# Patient Record
Sex: FEMALE | Race: WHITE | Employment: FULL TIME | URBAN - METROPOLITAN AREA
[De-identification: names, ages, dates, MRNs, and addresses within clinical notes are randomized per-mention and may not be internally consistent; named-entity substitution may affect disease eponyms.]

---

## 2022-03-08 PROBLEM — J30.89 ENVIRONMENTAL AND SEASONAL ALLERGIES: Status: ACTIVE | Noted: 2022-03-08

## 2022-03-08 PROBLEM — K44.9 HIATAL HERNIA WITHOUT GANGRENE AND OBSTRUCTION: Status: ACTIVE | Noted: 2017-08-02

## 2022-03-08 PROBLEM — E78.2 MIXED HYPERLIPIDEMIA: Status: ACTIVE | Noted: 2022-03-08

## 2022-03-08 PROBLEM — Z82.49 FAMILY HISTORY OF HEART DISEASE: Chronic | Status: ACTIVE | Noted: 2022-03-08

## 2022-03-08 PROBLEM — I10 PRIMARY HYPERTENSION: Chronic | Status: ACTIVE | Noted: 2022-03-08

## 2022-03-08 PROBLEM — F41.9 ANXIETY: Status: ACTIVE | Noted: 2022-03-08

## 2022-03-08 PROBLEM — K21.9 GASTROESOPHAGEAL REFLUX DISEASE: Status: ACTIVE | Noted: 2022-03-08

## 2022-03-18 PROBLEM — K21.9 GASTROESOPHAGEAL REFLUX DISEASE: Status: ACTIVE | Noted: 2022-03-08

## 2022-03-18 PROBLEM — I10 PRIMARY HYPERTENSION: Status: ACTIVE | Noted: 2022-03-08

## 2022-03-19 PROBLEM — E78.2 MIXED HYPERLIPIDEMIA: Status: ACTIVE | Noted: 2022-03-08

## 2022-03-19 PROBLEM — J30.89 ENVIRONMENTAL AND SEASONAL ALLERGIES: Status: ACTIVE | Noted: 2022-03-08

## 2022-03-19 PROBLEM — Z82.49 FAMILY HISTORY OF HEART DISEASE: Status: ACTIVE | Noted: 2022-03-08

## 2022-03-19 PROBLEM — K44.9 HIATAL HERNIA WITHOUT GANGRENE AND OBSTRUCTION: Status: ACTIVE | Noted: 2017-08-02

## 2022-03-19 PROBLEM — F41.9 ANXIETY: Status: ACTIVE | Noted: 2022-03-08

## 2022-03-31 ENCOUNTER — HOSPITAL ENCOUNTER (OUTPATIENT)
Dept: CT IMAGING | Age: 47
Discharge: HOME | End: 2022-03-31
Attending: NURSE PRACTITIONER
Payer: SELF-PAY

## 2022-03-31 DIAGNOSIS — Z82.49 FAMILY HISTORY OF HEART DISEASE: Chronic | ICD-10-CM

## 2022-03-31 DIAGNOSIS — I10 PRIMARY HYPERTENSION: Chronic | ICD-10-CM

## 2022-03-31 PROCEDURE — 75571 CT HRT W/O DYE W/CA TEST: CPT

## 2022-07-06 ENCOUNTER — OFFICE VISIT (OUTPATIENT)
Dept: INTERNAL MEDICINE CLINIC | Facility: CLINIC | Age: 47
End: 2022-07-06
Payer: COMMERCIAL

## 2022-07-06 VITALS
HEIGHT: 66 IN | OXYGEN SATURATION: 99 % | HEART RATE: 96 BPM | BODY MASS INDEX: 30.86 KG/M2 | SYSTOLIC BLOOD PRESSURE: 162 MMHG | WEIGHT: 192 LBS | TEMPERATURE: 98.5 F | DIASTOLIC BLOOD PRESSURE: 93 MMHG

## 2022-07-06 DIAGNOSIS — Z12.11 ENCOUNTER FOR COLORECTAL CANCER SCREENING: ICD-10-CM

## 2022-07-06 DIAGNOSIS — J30.89 ENVIRONMENTAL AND SEASONAL ALLERGIES: ICD-10-CM

## 2022-07-06 DIAGNOSIS — Z12.12 ENCOUNTER FOR COLORECTAL CANCER SCREENING: ICD-10-CM

## 2022-07-06 DIAGNOSIS — E78.2 MIXED HYPERLIPIDEMIA: ICD-10-CM

## 2022-07-06 DIAGNOSIS — Z12.31 ENCOUNTER FOR SCREENING MAMMOGRAM FOR BREAST CANCER: ICD-10-CM

## 2022-07-06 DIAGNOSIS — I10 PRIMARY HYPERTENSION: Primary | ICD-10-CM

## 2022-07-06 DIAGNOSIS — Z23 NEED FOR DIPHTHERIA-TETANUS-PERTUSSIS (TDAP) VACCINE: ICD-10-CM

## 2022-07-06 DIAGNOSIS — F41.9 ANXIETY: ICD-10-CM

## 2022-07-06 PROCEDURE — 90471 IMMUNIZATION ADMIN: CPT | Performed by: NURSE PRACTITIONER

## 2022-07-06 PROCEDURE — 90715 TDAP VACCINE 7 YRS/> IM: CPT | Performed by: NURSE PRACTITIONER

## 2022-07-06 PROCEDURE — 99214 OFFICE O/P EST MOD 30 MIN: CPT | Performed by: NURSE PRACTITIONER

## 2022-07-06 RX ORDER — LISINOPRIL 10 MG/1
10 TABLET ORAL DAILY
Qty: 30 TABLET | Refills: 5 | Status: SHIPPED | OUTPATIENT
Start: 2022-07-06

## 2022-07-06 SDOH — ECONOMIC STABILITY: FOOD INSECURITY: WITHIN THE PAST 12 MONTHS, YOU WORRIED THAT YOUR FOOD WOULD RUN OUT BEFORE YOU GOT MONEY TO BUY MORE.: NEVER TRUE

## 2022-07-06 SDOH — ECONOMIC STABILITY: FOOD INSECURITY: WITHIN THE PAST 12 MONTHS, THE FOOD YOU BOUGHT JUST DIDN'T LAST AND YOU DIDN'T HAVE MONEY TO GET MORE.: NEVER TRUE

## 2022-07-06 ASSESSMENT — PATIENT HEALTH QUESTIONNAIRE - PHQ9
2. FEELING DOWN, DEPRESSED OR HOPELESS: 0
SUM OF ALL RESPONSES TO PHQ QUESTIONS 1-9: 0
1. LITTLE INTEREST OR PLEASURE IN DOING THINGS: 0
SUM OF ALL RESPONSES TO PHQ QUESTIONS 1-9: 0
SUM OF ALL RESPONSES TO PHQ9 QUESTIONS 1 & 2: 0

## 2022-07-06 ASSESSMENT — ENCOUNTER SYMPTOMS
SHORTNESS OF BREATH: 0
WHEEZING: 0
COUGH: 0

## 2022-07-06 ASSESSMENT — SOCIAL DETERMINANTS OF HEALTH (SDOH): HOW HARD IS IT FOR YOU TO PAY FOR THE VERY BASICS LIKE FOOD, HOUSING, MEDICAL CARE, AND HEATING?: NOT HARD AT ALL

## 2022-07-06 NOTE — PROGRESS NOTES
[facility]  Office Visit Note    Subjective:  Chief Complaint   Patient presents with    Annual Exam     Physical/ has a cough possible due to BP medcation had a really bad cold late may with cough tested negative for COVID thinks the cough is still there from being sick        Patient ID: Cortney Gates is a 41 y.o. female presenting to the office for the above. 62-year-old female for follow up. 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      BMP, TSH, CBC all wnl at work screening February 2022. Had a cold a few months ago; tested negative for Covid. Continues to have mild nonproductive cough. Has stopped her allergy medication. Does believe it is improving. --Try adding back allergy medication. If not improving, try changing lisinopril to losartan.      Hypertension--  HPI March 2022:   Never diagnosed with HTN before. Has been running 150s/90s at home. It was 160/82 at recent work screening. Elevated in office today as well. She checks her blood pressure at home, with readings 150s/90s. She tries to follow a low sodium diet. She walks daily and does workout videos for exercise. Denies chest pain, palpitations, shortness of breath, peripheral edema, severe headaches, dizziness, vision changes. 4/5/22:   She started lisinopril 10mg BID. Home BP much better controlled. She has been checking every day, with readings almost all 110-135/60-80. Denies chest pain, palpitations, shortness of breath, peripheral edema, severe headaches, dizziness, vision changes. ECG in office with sinus tachycardia; she was extremely anxious during the ECG.   7/6/22: She is taking lisinopril 10mg once a day; tolerating well. Home readings 110-120s/70s. --Advise low sodium DASH diet, regular exercise, weight loss.     Monitor BP regularly at home, and notify office if consistently >130/90.      Hyperlipidemia--  Last lipid panel February 2022, with cholesterol 241, HDL 48, , trigs 134. Not on medication. Current 10-year Alto risk score 7% at recent work screening.   -- Encouraged to follow a healthy low-fat diet, avoiding saturated/trans fats/fried and fatty foods, get regular exercise, and weight loss.      GERD--  Taking esomeprazole 20mg daily; tolerating well. No red flag symptoms.      Environmental allergies--  Stable with Allegra. Seasonal.       Anxiety--  HPI March 2022:   Has struggled with anxiety for years. Work stress has increased due to [employer]. She cares for elderly parents and has tried over past few years to prevent bringing anything to them. Has felt increased isolation. She has tried yoga, but feels her anxiety is uncontrolled. Discussed options, and she would like to try medication. Has never taken any antidepressants or antianxiety medications in the past.   She denies issues with depression or thoughts of hurting herself. 4/5/22:   She has had several panic attacks since our last visit. Has been working on coping mechanisms (deep breathing, etc.). Tachycardia, chest discomfort, sweating hands and feet, dry mouth. Feels her anxiety is partially work-related; symptoms (and BP as well) is better on the weekends. Struggling with daily anxiety at moderate level, and occasional panic attacks. She has not started the Lexapro. Not really interested in a daily medication at this time, as school is getting out soon, and she believes her symptoms will improve. 7/6/22: She has psychiatry appointment in September. She used the hydroxyzine twice, without much relief. Overall, she feels that her symptoms have really improved since school got out. She is not interested in medication at this time.      Health Maintenance:  *Colorectal cancer screening: No family history of colorectal cancer (paternal grandmother had polyps). Discussed options; Cologuard ordered today. *CAC score: score of 0 in March 2022.  She has strong family history of heart disease. *Mammogram: ordered today   *Pap: she plans to establish with gynecology   *TDAP: 7/6/22   *Flu:   *YRINS02: completed March 2021, booster January 2022       History: Allergies   Allergen Reactions    Cats Claw (Uncaria Tomentosa) Rash    Dog Epithelium Allergy Skin Test Rash       Past Medical History:   Diagnosis Date    Anxiety     GERD (gastroesophageal reflux disease)     Hiatal hernia        History reviewed. No pertinent surgical history.     Family History   Problem Relation Age of Onset    Hypertension Paternal Grandfather     Colon Cancer Paternal Grandmother     Osteoarthritis Mother     Diabetes Mother     Elevated Lipids Mother    Esther Craw Bladder Disease Mother     Heart Disease Mother     Hypertension Mother     Heart Attack Mother         in her 46s    Breast Cancer Mother     Elevated Lipids Father     Heart Disease Father     Hypertension Father     Heart Attack Father     Diabetes Maternal Grandmother     Heart Disease Maternal Grandmother     Hypertension Maternal Grandmother     Heart Attack Maternal Grandmother     Diabetes Maternal Grandfather     Heart Disease Maternal Grandfather     Hypertension Maternal Grandfather     Heart Attack Maternal Grandfather     Heart Disease Paternal Grandmother     Hypertension Paternal Grandmother     Stroke Paternal Grandmother     Heart Disease Paternal Grandfather        Social History     Tobacco Use   Smoking Status Never Smoker   Smokeless Tobacco Never Used       Social History     Substance and Sexual Activity   Alcohol Use Never       Current Outpatient Medications   Medication Sig Dispense Refill    Omega-3 Fatty Acids (FISH OIL ADULT GUMMIES PO) Take by mouth      lisinopril (PRINIVIL;ZESTRIL) 10 MG tablet Take 1 tablet by mouth daily 30 tablet 5    Cholecalciferol 50 MCG (2000 UT) TABS Take 1 tablet by mouth      fexofenadine (ALLEGRA) 180 MG tablet Take 180 mg by mouth  hydrOXYzine (ATARAX) 25 MG tablet Take 25 mg by mouth 2 times daily as needed       No current facility-administered medications for this visit. Review of Systems:  Review of Systems   Constitutional: Negative for activity change, appetite change, chills, fever and unexpected weight change. Respiratory: Negative for cough, shortness of breath and wheezing. Cardiovascular: Negative for chest pain and palpitations. Skin: Negative for pallor and rash. Neurological: Negative for dizziness, seizures, syncope, weakness and headaches. Psychiatric/Behavioral: Negative for dysphoric mood and suicidal ideas. The patient is nervous/anxious. See HPI for other pertinent positives and negatives. Objective:  Vitals:    07/06/22 0953   BP: (!) 162/93   Site: Left Upper Arm   Position: Sitting   Cuff Size: Large Adult   Pulse: 96   Temp: 98.5 °F (36.9 °C)   TempSrc: Temporal   SpO2: 99%   Weight: 192 lb (87.1 kg)   Height: 5' 6\" (1.676 m)       Body mass index is 30.99 kg/m². Physical Exam  Vitals and nursing note reviewed. Constitutional:       General: She is not in acute distress. Appearance: Normal appearance. She is not ill-appearing or diaphoretic. HENT:      Head: Normocephalic and atraumatic. Eyes:      General: No scleral icterus. Right eye: No discharge. Left eye: No discharge. Cardiovascular:      Rate and Rhythm: Normal rate and regular rhythm. Heart sounds: Normal heart sounds. Pulmonary:      Effort: Pulmonary effort is normal. No respiratory distress. Breath sounds: Normal breath sounds. Skin:     General: Skin is warm and dry. Neurological:      Mental Status: She is alert and oriented to person, place, and time.    Psychiatric:         Mood and Affect: Mood normal.         Speech: Speech normal.         Behavior: Behavior normal.                    PHQ-9  7/6/2022   Little interest or pleasure in doing things 0   Little interest or pleasure in doing things -   Feeling down, depressed, or hopeless 0   Trouble falling or staying asleep, or sleeping too much -   Feeling tired or having little energy -   Poor appetite, weight loss, or overeating -   Feeling bad about yourself - or that you are a failure or have let yourself or your family down -   Trouble concentrating on things such as school, work, reading, or watching TV -   Moving or speaking so slowly that other people could have noticed; or the opposite being so fidgety that others notice -   Thoughts of being better off dead, or hurting yourself in some way -   PHQ-2 Score 0   Total Score PHQ 2 -   PHQ-9 Total Score 0   PHQ 9 Score -        Assessment/Plan:      Health Maintenance Due   Topic Date Due    HIV screen  Never done    Hepatitis C screen  Never done    Cervical cancer screen  Never done    Diabetes screen  Never done    Lipids  Never done    Breast cancer screen  Never done    Colorectal Cancer Screen  Never done          Neto Connors was seen today for annual exam.    Diagnoses and all orders for this visit:    Primary hypertension  -     lisinopril (PRINIVIL;ZESTRIL) 10 MG tablet; Take 1 tablet by mouth daily    Mixed hyperlipidemia    Anxiety    Environmental and seasonal allergies    Encounter for screening mammogram for breast cancer  -     JANNA DIGITAL SCREEN W OR WO CAD BILATERAL; Future    Encounter for colorectal cancer screening  -     Cologuard (Fecal DNA Colorectal Cancer Screening); Future    Need for diphtheria-tetanus-pertussis (Tdap) vaccine  -     Tdap (age 6y and older) IM (Boostrix)        Patient states she is otherwise doing well; has no further questions or concerns at this visit. Encouraged to contact office with any concerns prior to next visit. Return in about 6 months (around 1/6/2023), or if symptoms worsen or fail to improve, for Follow up.     Karin Ge, APRN - CNP

## 2022-07-13 ENCOUNTER — HOSPITAL ENCOUNTER (OUTPATIENT)
Dept: MAMMOGRAPHY | Age: 47
Discharge: HOME | End: 2022-07-16
Payer: COMMERCIAL

## 2022-07-13 DIAGNOSIS — Z12.31 ENCOUNTER FOR SCREENING MAMMOGRAM FOR BREAST CANCER: ICD-10-CM

## 2022-07-13 PROCEDURE — 77067 SCR MAMMO BI INCL CAD: CPT

## 2022-07-21 ENCOUNTER — HOSPITAL ENCOUNTER (OUTPATIENT)
Dept: MAMMOGRAPHY | Age: 47
Discharge: HOME | End: 2022-07-24
Payer: COMMERCIAL

## 2022-07-21 ENCOUNTER — APPOINTMENT (OUTPATIENT)
Dept: MAMMOGRAPHY | Age: 47
End: 2022-07-21
Payer: COMMERCIAL

## 2022-07-21 DIAGNOSIS — R92.8 ABNORMAL SCREENING MAMMOGRAM: ICD-10-CM

## 2022-07-21 PROCEDURE — 77065 DX MAMMO INCL CAD UNI: CPT

## 2022-09-12 ENCOUNTER — OFFICE VISIT (OUTPATIENT)
Dept: BEHAVIORAL/MENTAL HEALTH CLINIC | Facility: CLINIC | Age: 47
End: 2022-09-12
Payer: COMMERCIAL

## 2022-09-12 DIAGNOSIS — F41.9 ANXIETY DISORDER, UNSPECIFIED TYPE: Primary | ICD-10-CM

## 2022-09-12 PROCEDURE — 90791 PSYCH DIAGNOSTIC EVALUATION: CPT | Performed by: SOCIAL WORKER

## 2022-09-13 NOTE — PROGRESS NOTES
[]fatigue, []lack of interest,   [x]decreased concentration, [x]anxiety [x]panic attacks, []suicidal thoughts,    []homicidal thoughts, []hallucinations, []appetite/sleep pxs   []delusions, []racing thoughts, []grandiosity, []dheeraj,   []eating disordered symptoms, []obsessions and compulsions, []hopelessness,   []feelings of failure, []excessive worrying []other    Mental Health History (including family history):  []Current Therapy    []Inpatient Treatment  []Past Therapy    [x]PCP  []Current Psychiatrist   []Family History- paternal  []Past Psychiatrist    []Family History- maternal   []PHP    Summary:  The pt recently sought help from her PCP last March. She was referred to this SW and she was rescribed medication. She has not filled her Lexapro script and she did report benefit from the hydroxyzine. Orientation: Pt was oriented to person, place, time, and purpose of interview. Appearance/Personal Hygiene: Pt was appropriately dressed and neatly groomed. Eye Contact: Good    Psychosis:  Hallucinations: None  Paranoia/Delusions: None                                          Insight/Judgment: Insight and judgment were intact. Intelligence: Intelligence level appears to be WNL. Memory/Cognition/Executive Functioning:  Pt denies memory deficits. Executive functioning skills appear to be intact. Attention Span/Concentration:  The pt reports a decrease in concentration due to her increase in anxiety. Fund Of Knowledge: This is within normal limits. Developmental/Language Issues: English is the pt.'s primary language. No developmental issues reported.     Mood/Affect:   []Angry  [x]Anxious  []Appropriate  []Bright   []Distressed  []Fatigued  []Flat   []Sad, Depressed  []Guarded  []Irritable  []Labile  []Even       Thought Process:   []Blocking  []Circumstantial  []Clang   [x]Coherent  []Egocentric   []Evasive  []Flight of ideas  []Incoherent  []Loose Assn   []Magical think []Neologisms  []Perseveration  [x]Rational  []Tangential  []Word Salad         Suicidal Ideation/Intentions (present status, history, plan, intent, past attempts): The pt denies current or past suicidal ideation. Homicidal Ideation/Intentions: The pt denies current or past homicidal ideation. Substance Abuse (present use, past use, substances used, family history): The pt denies current or past substance abuse issues. She states she has an uncle of her dad's side of the family that is an alcoholic. Current Living Situation (type of dwelling, length of residence, safety concerns, stability):  []Rent  [x]Own  [x]House []Mobile Home []Apt  []Condo/Town Home  Time at Current Residence:3.5 years  Utilities Working:yes  Safe/Secure Environment:yes  Who lives in the home? The pt lives alone. Relationship Status (past relationships, current status, children, relationship issues): The pt is single and she has never been . She reports one past serious relationship that lasted about 10 years. She met him as an undergrad. They both pursued degrees after they received their Bachelor's and ultimately their careers took them in different directions. The pt identifies as straight and she denies any abusive relationships in the past.    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 This is her fifth year at [employer]. She works at [employer] in [employer] starting this year. This is better than her previous assignment but she still reports much stress. Education:  The pt has a Masters in Special Education and Speech Pathology.  History: []Yes  [x]No    Legal Issues: []Yes  [x]No    Support Systems: The pt receives much support from her sister, her parents and other family members. She used to have many close friends but since she moved to [facility], her social contact has decreased.     Self Esteem/ Body Image:  The pt feels her self esteem is okay. Family of Origin Dynamics:    Birthplace: The pt was born and raised in [place]. Your primary caregivers:  She was raised by her biological parents and they are both still living and still . Family composition/siblings:  The pt has one older sister. General home atmosphere:  The pt reports a safe and happy childhood. Words that best describe your childhood: Stable, good    Your home environment and routines (like mealtimes and household rules): The pt feels she was raised in a fair, nurturing home. Summary:  The pt denies abuse or trauma in her childhood. Past Abuse/Trauma/Grief:  []Childhood Abuse    []Active PTSD Symptoms  []Domestic Violence- childhood  []Past PTSD Symptoms- not current  []Domestic Violence- adult   []Past Treatment for Trauma/Abuse  []Childhood Trauma    []Significant Losses  []Adulthood Trauma     Summary:  The pt denies abuse or trauma as an adult. She has lost grandparents but she has dealt with these losses appropriately. Coping Skills: The pt wants to learn coping skills. Attitude Towards Treatment: Positive    CBT, CPT, ERP, supportive therapy, solution focused therapy, DBT, ACT, motivational interviewing, psychodynamic therapy, and client centered/humanistic therapy may be utilized to address the following goals:     Pt will decrease her symptoms of depression and anxiety by recognizing cognitive distortions and positively reframing them as evidenced by self-report and lower PHQ and MORA scores. The pt will decrease the number of panic attacks each week by being able to identify triggering factors and practicing grounding techniques before the attach occurs for three months as evidenced by the pt reporting having no more than one panic attack a day.     Estimated Length of Treatment: 3-5 months    Follow-Up: 2 weeks

## 2022-09-29 ENCOUNTER — OFFICE VISIT (OUTPATIENT)
Dept: BEHAVIORAL/MENTAL HEALTH CLINIC | Facility: CLINIC | Age: 47
End: 2022-09-29
Payer: COMMERCIAL

## 2022-09-29 DIAGNOSIS — F41.9 ANXIETY DISORDER, UNSPECIFIED TYPE: Primary | ICD-10-CM

## 2022-09-29 PROCEDURE — 90837 PSYTX W PT 60 MINUTES: CPT | Performed by: SOCIAL WORKER

## 2022-09-30 NOTE — PROGRESS NOTES
INDIVIDUAL THERAPY NOTE  NAME: Stan Durbin    DATE: 9/29/22    TYPE OF SERVICE: Individual Therapy/In person visit/SW and pt in [place]    LOCATION OF SERVICE: [facility]    DURATION: 60 mins    DIAGNOSIS: :    1. Anxiety disorder, unspecified type        CHIEF COMPLAINT: anxiety    MENTAL STATUS EXAM:  Pt was appropriately groomed. Pt was 0x4. No unusual mannerisms were noted. No psychotic symptoms displayed by pt. Pt was cooperative and engaged in the session. Insight and judgment were intact. Denied suicidal or homicidal ideation. Fund of knowledge appears to be WNL. Fund of knowledge and attention span are WNL. Denies developmental or language difficulties. 0x4. Mood/Affect: mildly anxious with congruent affect  Thought Process: linear and coherent    Pt is progressing on goals. PLAN: CBT, DBT, Humanistic/Client Centered, ACT, Seeking Safety, Psychodynamic, ERP may be used to address goals. Summary of Service: This SW met with the pt face to face in the office. The pt continues to report anxiety. She has been trying to utilize the techniques discussed last session, Butterfly and Pretzel. She finds she can do these at home but work presents as a challenge. This SW and the pt discussed what thoughts she had about what would happen of she took more time to relax at work and if she set firmer boundaries at work. This SW encouraged the pt to challenge some of these thoughts. The Cognitive Distortions were introduced and she was given the list Of Cognitive Distortions and Socratic questions to take home. Concepts from Re Wiring the Anxious brain and how to carve new neural pathways that promoted relaxation and decreased anxiety were discussed. This will increase in future sessions. Follow Up: 2 weeks          Will continue to meet with and be available to patient as scheduled and per patient's request/compliance.

## 2023-02-03 DIAGNOSIS — Z12.12 ENCOUNTER FOR COLORECTAL CANCER SCREENING: ICD-10-CM

## 2023-02-03 DIAGNOSIS — Z12.11 ENCOUNTER FOR COLORECTAL CANCER SCREENING: ICD-10-CM

## 2023-02-23 ENCOUNTER — OFFICE VISIT (OUTPATIENT)
Dept: INTERNAL MEDICINE CLINIC | Facility: CLINIC | Age: 48
End: 2023-02-23
Payer: COMMERCIAL

## 2023-02-23 VITALS
TEMPERATURE: 98.4 F | DIASTOLIC BLOOD PRESSURE: 86 MMHG | WEIGHT: 189.2 LBS | OXYGEN SATURATION: 99 % | SYSTOLIC BLOOD PRESSURE: 146 MMHG | BODY MASS INDEX: 30.41 KG/M2 | HEART RATE: 100 BPM | HEIGHT: 66 IN

## 2023-02-23 DIAGNOSIS — F41.9 ANXIETY: Primary | ICD-10-CM

## 2023-02-23 DIAGNOSIS — I10 PRIMARY HYPERTENSION: ICD-10-CM

## 2023-02-23 LAB — NONINV COLON CA DNA+OCC BLD SCRN STL QL: NEGATIVE

## 2023-02-23 PROCEDURE — 3077F SYST BP >= 140 MM HG: CPT | Performed by: NURSE PRACTITIONER

## 2023-02-23 PROCEDURE — 99214 OFFICE O/P EST MOD 30 MIN: CPT | Performed by: NURSE PRACTITIONER

## 2023-02-23 PROCEDURE — 3079F DIAST BP 80-89 MM HG: CPT | Performed by: NURSE PRACTITIONER

## 2023-02-23 RX ORDER — LOSARTAN POTASSIUM 25 MG/1
25 TABLET ORAL DAILY
Qty: 90 TABLET | Refills: 1 | Status: SHIPPED | OUTPATIENT
Start: 2023-02-23

## 2023-02-23 RX ORDER — LISINOPRIL 10 MG/1
10 TABLET ORAL DAILY
Qty: 30 TABLET | Refills: 5 | Status: CANCELLED | OUTPATIENT
Start: 2023-02-23

## 2023-02-23 SDOH — ECONOMIC STABILITY: FOOD INSECURITY: WITHIN THE PAST 12 MONTHS, YOU WORRIED THAT YOUR FOOD WOULD RUN OUT BEFORE YOU GOT MONEY TO BUY MORE.: NEVER TRUE

## 2023-02-23 SDOH — ECONOMIC STABILITY: TRANSPORTATION INSECURITY
IN THE PAST 12 MONTHS, HAS LACK OF TRANSPORTATION KEPT YOU FROM MEETINGS, WORK, OR FROM GETTING THINGS NEEDED FOR DAILY LIVING?: NO

## 2023-02-23 SDOH — ECONOMIC STABILITY: FOOD INSECURITY: WITHIN THE PAST 12 MONTHS, THE FOOD YOU BOUGHT JUST DIDN'T LAST AND YOU DIDN'T HAVE MONEY TO GET MORE.: NEVER TRUE

## 2023-02-23 SDOH — ECONOMIC STABILITY: INCOME INSECURITY: HOW HARD IS IT FOR YOU TO PAY FOR THE VERY BASICS LIKE FOOD, HOUSING, MEDICAL CARE, AND HEATING?: NOT VERY HARD

## 2023-02-23 SDOH — ECONOMIC STABILITY: HOUSING INSECURITY
IN THE LAST 12 MONTHS, WAS THERE A TIME WHEN YOU DID NOT HAVE A STEADY PLACE TO SLEEP OR SLEPT IN A SHELTER (INCLUDING NOW)?: NO

## 2023-02-23 ASSESSMENT — ENCOUNTER SYMPTOMS
TROUBLE SWALLOWING: 0
ABDOMINAL PAIN: 0
DIARRHEA: 0
COUGH: 1
SHORTNESS OF BREATH: 0
VOMITING: 0
NAUSEA: 0

## 2023-02-23 ASSESSMENT — PATIENT HEALTH QUESTIONNAIRE - PHQ9
SUM OF ALL RESPONSES TO PHQ9 QUESTIONS 1 & 2: 0
2. FEELING DOWN, DEPRESSED OR HOPELESS: 0
SUM OF ALL RESPONSES TO PHQ QUESTIONS 1-9: 0
SUM OF ALL RESPONSES TO PHQ QUESTIONS 1-9: 0
1. LITTLE INTEREST OR PLEASURE IN DOING THINGS: 0
SUM OF ALL RESPONSES TO PHQ QUESTIONS 1-9: 0
SUM OF ALL RESPONSES TO PHQ QUESTIONS 1-9: 0

## 2023-02-23 ASSESSMENT — ANXIETY QUESTIONNAIRES
7. FEELING AFRAID AS IF SOMETHING AWFUL MIGHT HAPPEN: 0
IF YOU CHECKED OFF ANY PROBLEMS ON THIS QUESTIONNAIRE, HOW DIFFICULT HAVE THESE PROBLEMS MADE IT FOR YOU TO DO YOUR WORK, TAKE CARE OF THINGS AT HOME, OR GET ALONG WITH OTHER PEOPLE: NOT DIFFICULT AT ALL
5. BEING SO RESTLESS THAT IT IS HARD TO SIT STILL: 0
3. WORRYING TOO MUCH ABOUT DIFFERENT THINGS: 0
4. TROUBLE RELAXING: 0
6. BECOMING EASILY ANNOYED OR IRRITABLE: 0
2. NOT BEING ABLE TO STOP OR CONTROL WORRYING: 0
GAD7 TOTAL SCORE: 0
1. FEELING NERVOUS, ANXIOUS, OR ON EDGE: 0

## 2023-02-23 NOTE — PROGRESS NOTES
[facility]  Office Visit Note    Subjective:  Chief Complaint   Patient presents with    Hypertension       Patient ID: Jacqueline Sullivan is a 57 y.o. female presenting to the office for the above. 56-year-old female for follow up. 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 BMP, TSH, CBC all wnl at work screening February 2022. She will be getting labs done at work later this month; requested she send a copy to this office. Hypertension--  HPI March 2022:   Never diagnosed with HTN before. Has been running 150s/90s at home. It was 160/82 at recent work screening. Elevated in office today as well. She checks her blood pressure at home, with readings 150s/90s. She tries to follow a low sodium diet. She walks daily and does workout videos for exercise. Denies chest pain, palpitations, shortness of breath, peripheral edema, severe headaches, dizziness, vision changes. 4/5/22: She started lisinopril 10mg BID. Home BP much better controlled. She has been checking every day, with readings almost all 110-135/60-80. Denies chest pain, palpitations, shortness of breath, peripheral edema, severe headaches, dizziness, vision changes. ECG in office with sinus tachycardia; she was extremely anxious during the ECG.   7/6/22: She is taking lisinopril 10mg once a day; tolerating well. Home readings 110-120s/70s.   2/23/23: Taking lisinopril 10mg daily. Home BP running 110-120s/70s. Endorses some white coat hypertension. Denies chest pain, palpitations, shortness of breath, peripheral edema, severe headaches, dizziness. She has developed a nagging dry cough on the lisinopril; will switch to losartan 25mg daily; discussed risks/benefits, alternatives, potential side effects, proper administration of medication. --Advise low sodium DASH diet, regular exercise, weight loss.     Monitor BP regularly at home, and notify office if consistently >130/90. Hyperlipidemia--  Last lipid panel February 2022, with cholesterol 241, HDL 48, , trigs 134. Not on medication. Current 10-year [place] risk score 7% at last work screening.   -- Encouraged to follow a healthy low-fat diet, avoiding saturated/trans fats/fried and fatty foods, get regular exercise, and weight loss. GERD--  Taking esomeprazole 20mg daily; tolerating well. No red flag symptoms. Environmental allergies--  Stable with Allegra. Seasonal.       Anxiety--  HPI March 2022:   Has struggled with anxiety for years. Work stress has increased due to [employer]. She cares for elderly parents and has tried over past few years to prevent bringing anything to them. Has felt increased isolation. She has tried yoga, but feels her anxiety is uncontrolled. Discussed options, and she would like to try medication. Has never taken any antidepressants or antianxiety medications in the past.   She denies issues with depression or thoughts of hurting herself. 4/5/22:   She has had several panic attacks since our last visit. Has been working on coping mechanisms (deep breathing, etc.). Tachycardia, chest discomfort, sweating hands and feet, dry mouth. Feels her anxiety is partially work-related; symptoms (and BP as well) is better on the weekends. Struggling with daily anxiety at moderate level, and occasional panic attacks. She has not started the Lexapro. Not really interested in a daily medication at this time, as school is getting out soon, and she believes her symptoms will improve. 7/6/22: She has psychiatry appointment in September. She used the hydroxyzine twice, without much relief. Overall, she feels that her symptoms have really improved since school got out. She is not interested in medication at this time. 2/23/23: She went to two appointments with therapist. Needs location closer to home. List of community resources provided. Still having issues with anxiety. Did not like the way hydroxyzine made her feel. Discussed options, including trials of Buspar or Lexapro. She will let me know if symptoms worsen or she would like to try medication. Health Maintenance:  *Colorectal cancer screening: No family history of colorectal cancer (paternal grandmother had polyps). Discussed options; Cologuard was ordered. *CAC score: score of 0 in March 2022. She has strong family history of heart disease. *Mammogram: July 2022   *Pap: she plans to establish with gynecology   *TDAP: 7/6/22   *Flu:   *Covid19: completed March 2021, booster January 2022       History: Allergies   Allergen Reactions    Cats Claw (Uncaria Tomentosa) Rash    Dog Epithelium Allergy Skin Test Rash       Past Medical History:   Diagnosis Date    Anxiety     GERD (gastroesophageal reflux disease)     Hiatal hernia        History reviewed. No pertinent surgical history.     Family History   Problem Relation Age of Onset    Hypertension Paternal Grandfather     Colon Cancer Paternal Grandmother     Osteoarthritis Mother     Diabetes Mother     Elevated Lipids Mother     Sergio Mendoza Disease Mother     Heart Disease Mother     Hypertension Mother     Heart Attack Mother         in her 46s    Breast Cancer Mother     Elevated Lipids Father     Heart Disease Father     Hypertension Father     Heart Attack Father     Diabetes Maternal Grandmother     Heart Disease Maternal Grandmother     Hypertension Maternal Grandmother     Heart Attack Maternal Grandmother     Diabetes Maternal Grandfather     Heart Disease Maternal Grandfather     Hypertension Maternal Grandfather     Heart Attack Maternal Grandfather     Heart Disease Paternal Grandmother     Hypertension Paternal Grandmother     Stroke Paternal Grandmother     Heart Disease Paternal Grandfather        Social History     Tobacco Use   Smoking Status Never   Smokeless Tobacco Never       Social History     Substance and Sexual Activity   Alcohol Use Never Current Outpatient Medications   Medication Sig Dispense Refill    losartan (COZAAR) 25 MG tablet Take 1 tablet by mouth daily 90 tablet 1    Omega-3 Fatty Acids (FISH OIL ADULT GUMMIES PO) Take by mouth      lisinopril (PRINIVIL;ZESTRIL) 10 MG tablet Take 1 tablet by mouth daily 30 tablet 5    Cholecalciferol 50 MCG (2000 UT) TABS Take 1 tablet by mouth      fexofenadine (ALLEGRA) 180 MG tablet Take 180 mg by mouth       No current facility-administered medications for this visit. Review of Systems:  Review of Systems   Constitutional:  Negative for activity change and appetite change. HENT:  Negative for congestion and trouble swallowing. Eyes:  Negative for visual disturbance. Respiratory:  Positive for cough. Negative for shortness of breath. Cardiovascular:  Negative for palpitations and leg swelling. Gastrointestinal:  Negative for abdominal pain, diarrhea, nausea and vomiting. Skin:  Negative for pallor and rash. Neurological:  Negative for dizziness, seizures, syncope and headaches. Psychiatric/Behavioral:  Negative for dysphoric mood. The patient is nervous/anxious. See HPI for other pertinent positives and negatives. Objective:  Vitals:    02/23/23 1427 02/23/23 1433   BP: (!) 151/92 (!) 146/86   Site: Right Upper Arm Right Upper Arm   Position: Sitting Sitting   Cuff Size: Large Adult Large Adult   Pulse: (!) 116 100   Temp: 98.4 °F (36.9 °C)    TempSrc: Temporal    SpO2: 99%    Weight: 189 lb 3.2 oz (85.8 kg)    Height: 5' 6\" (1.676 m)        Body mass index is 30.54 kg/m². Physical Exam  Vitals and nursing note reviewed. Constitutional:       General: She is not in acute distress. Appearance: Normal appearance. She is not ill-appearing or diaphoretic. HENT:      Head: Normocephalic and atraumatic. Eyes:      General: No scleral icterus. Right eye: No discharge. Left eye: No discharge.    Cardiovascular:      Rate and Rhythm: Normal rate and regular rhythm. Pulses: Normal pulses. Heart sounds: Normal heart sounds. Pulmonary:      Effort: Pulmonary effort is normal. No respiratory distress. Breath sounds: Normal breath sounds. No wheezing, rhonchi or rales. Musculoskeletal:      Cervical back: No rigidity. Right lower leg: No edema. Left lower leg: No edema. Lymphadenopathy:      Cervical: No cervical adenopathy. Skin:     General: Skin is warm and dry. Neurological:      Mental Status: She is alert and oriented to person, place, and time. Psychiatric:         Mood and Affect: Mood normal.         Speech: Speech normal.         Behavior: Behavior normal.                PHQ-9  2/23/2023   Little interest or pleasure in doing things 0   Little interest or pleasure in doing things -   Feeling down, depressed, or hopeless 0   Trouble falling or staying asleep, or sleeping too much -   Trouble falling or staying asleep, or sleeping too much -   Feeling tired or having little energy -   Feeling tired or having little energy -   Poor appetite or overeating -   Poor appetite, weight loss, or overeating -   Feeling bad about yourself - or that you are a failure or have let yourself or your family down -   Feeling bad about yourself - or that you are a failure or have let yourself or your family down -   Trouble concentrating on things, such as reading the newspaper or watching television -   Trouble concentrating on things such as school, work, reading, or watching TV -   Moving or speaking so slowly that other people could have noticed.  Or the opposite - being so fidgety or restless that you have been moving around a lot more than usual -   Moving or speaking so slowly that other people could have noticed; or the opposite being so fidgety that others notice -   Thoughts of being better off dead, or hurting yourself in some way -   PHQ-2 Score 0   Total Score PHQ 2 -   PHQ-9 Total Score 0   PHQ 9 Score - Assessment/Plan:      Health Maintenance Due   Topic Date Due    HIV screen  Never done    Hepatitis C screen  Never done    Cervical cancer screen  Never done    Diabetes screen  Never done    Lipids  Never done    Colorectal Cancer Screen  Never done    COVID-19 Vaccine (4 - Booster for Moderna series) 02/27/2022    Flu vaccine (1) Never done          Milly Murdock was seen today for hypertension. Diagnoses and all orders for this visit:    Anxiety    Primary hypertension  -     losartan (COZAAR) 25 MG tablet; Take 1 tablet by mouth daily      Patient states she is otherwise doing well; has no further questions or concerns at this visit. Encouraged to contact office with any concerns prior to next visit. Return in about 6 months (around 8/23/2023), or if symptoms worsen or fail to improve, for Follow up.     Eron Gary, APRN - CNP

## 2023-03-01 ENCOUNTER — TELEPHONE (OUTPATIENT)
Dept: INTERNAL MEDICINE CLINIC | Facility: CLINIC | Age: 48
End: 2023-03-01

## 2023-03-01 NOTE — TELEPHONE ENCOUNTER
Patient stated that she started taking the Losartan 25mg on Saturday. Medication was prescribed 2/23/2023. She stated that she is taking one pill and that her blood pressure is running around 145/90 and the lowest she has gotten is first thing in the morning at 126/87.  She is asking if she needs to increase to 2 pills or just wait and see how the medication continues to do

## 2023-03-02 NOTE — TELEPHONE ENCOUNTER
Yes, she can increase to two tablets (50mg) daily.   Continue to monitor BP at home, and let me know if remains consistently >130/90 after several weeks, or if she starts having low readings/dizziness/fatigue/etc.

## 2023-03-02 NOTE — TELEPHONE ENCOUNTER
Yes, she can increase to two tablets (50mg) daily. Continue to monitor BP at home, and let me know if remains consistently >130/90 after several weeks, or if she starts having low readings/dizziness/fatigue/etc    Spoke to patient, message above was relayed and understanding expressed.

## 2023-03-31 ENCOUNTER — OFFICE VISIT (OUTPATIENT)
Dept: INTERNAL MEDICINE CLINIC | Facility: CLINIC | Age: 48
End: 2023-03-31
Payer: COMMERCIAL

## 2023-03-31 VITALS
HEART RATE: 100 BPM | SYSTOLIC BLOOD PRESSURE: 135 MMHG | HEIGHT: 63 IN | RESPIRATION RATE: 14 BRPM | OXYGEN SATURATION: 99 % | BODY MASS INDEX: 34.11 KG/M2 | WEIGHT: 192.5 LBS | DIASTOLIC BLOOD PRESSURE: 82 MMHG | TEMPERATURE: 99.3 F

## 2023-03-31 DIAGNOSIS — I10 PRIMARY HYPERTENSION: ICD-10-CM

## 2023-03-31 PROCEDURE — 99214 OFFICE O/P EST MOD 30 MIN: CPT | Performed by: NURSE PRACTITIONER

## 2023-03-31 PROCEDURE — 3075F SYST BP GE 130 - 139MM HG: CPT | Performed by: NURSE PRACTITIONER

## 2023-03-31 PROCEDURE — 3079F DIAST BP 80-89 MM HG: CPT | Performed by: NURSE PRACTITIONER

## 2023-03-31 RX ORDER — LOSARTAN POTASSIUM 25 MG/1
75 TABLET ORAL DAILY
Qty: 270 TABLET | Refills: 1 | Status: SHIPPED | OUTPATIENT
Start: 2023-03-31

## 2023-03-31 ASSESSMENT — PATIENT HEALTH QUESTIONNAIRE - PHQ9
SUM OF ALL RESPONSES TO PHQ QUESTIONS 1-9: 0
2. FEELING DOWN, DEPRESSED OR HOPELESS: 0
SUM OF ALL RESPONSES TO PHQ9 QUESTIONS 1 & 2: 0
SUM OF ALL RESPONSES TO PHQ QUESTIONS 1-9: 0
1. LITTLE INTEREST OR PLEASURE IN DOING THINGS: 0

## 2023-03-31 ASSESSMENT — ENCOUNTER SYMPTOMS
COUGH: 0
VOMITING: 0
SHORTNESS OF BREATH: 0
NAUSEA: 0
ABDOMINAL PAIN: 0

## 2023-03-31 NOTE — PROGRESS NOTES
[facility]  Office Visit Note    Subjective:  Chief Complaint   Patient presents with    Hypertension       Patient ID: Yanique Pinedo is a 57 y.o. female presenting to the office for the above. 53-year-old female to discuss hypertension. 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 BMP, TSH, CBC, A1c all wnl at work screening February 2023 (see scanned document). Hypertension--  HPI March 2022:   Never diagnosed with HTN before. Has been running 150s/90s at home. It was 160/82 at recent work screening. Elevated in office today as well. She checks her blood pressure at home, with readings 150s/90s. She tries to follow a low sodium diet. She walks daily and does workout videos for exercise. Denies chest pain, palpitations, shortness of breath, peripheral edema, severe headaches, dizziness, vision changes. 4/5/22: She started lisinopril 10mg BID. Home BP much better controlled. She has been checking every day, with readings almost all 110-135/60-80. Denies chest pain, palpitations, shortness of breath, peripheral edema, severe headaches, dizziness, vision changes. ECG in office with sinus tachycardia; she was extremely anxious during the ECG.   7/6/22: She is taking lisinopril 10mg once a day; tolerating well. Home readings 110-120s/70s.   2/23/23: Taking lisinopril 10mg daily. Home BP running 110-120s/70s. Endorses some white coat hypertension. Denies chest pain, palpitations, shortness of breath, peripheral edema, severe headaches, dizziness. She has developed a nagging dry cough on the lisinopril; will switch to losartan 25mg daily; discussed risks/benefits, alternatives, potential side effects, proper administration of medication. 3/31/23: Taking losartan 50mg daily. Cough resolved since she stopped lisinopril. Home BP has trended down over the past month since increasing losartan to 50mg daily.   Mostly

## 2023-07-14 ENCOUNTER — HOSPITAL ENCOUNTER (OUTPATIENT)
Dept: MAMMOGRAPHY | Age: 48
Discharge: HOME | End: 2023-07-14
Payer: COMMERCIAL

## 2023-07-14 DIAGNOSIS — Z12.31 VISIT FOR SCREENING MAMMOGRAM: ICD-10-CM

## 2023-07-14 PROCEDURE — 77067 SCR MAMMO BI INCL CAD: CPT

## 2024-04-02 ENCOUNTER — OFFICE VISIT (OUTPATIENT)
Dept: INTERNAL MEDICINE CLINIC | Facility: CLINIC | Age: 49
End: 2024-04-02
Payer: COMMERCIAL

## 2024-04-02 VITALS
BODY MASS INDEX: 36.07 KG/M2 | WEIGHT: 196 LBS | HEIGHT: 62 IN | TEMPERATURE: 98.6 F | HEART RATE: 105 BPM | SYSTOLIC BLOOD PRESSURE: 136 MMHG | OXYGEN SATURATION: 99 % | DIASTOLIC BLOOD PRESSURE: 82 MMHG

## 2024-04-02 DIAGNOSIS — E78.2 MIXED HYPERLIPIDEMIA: ICD-10-CM

## 2024-04-02 DIAGNOSIS — Z00.00 ANNUAL PHYSICAL EXAM: Primary | ICD-10-CM

## 2024-04-02 DIAGNOSIS — I10 PRIMARY HYPERTENSION: ICD-10-CM

## 2024-04-02 DIAGNOSIS — Z12.31 ENCOUNTER FOR SCREENING MAMMOGRAM FOR BREAST CANCER: ICD-10-CM

## 2024-04-02 PROCEDURE — 3075F SYST BP GE 130 - 139MM HG: CPT | Performed by: NURSE PRACTITIONER

## 2024-04-02 PROCEDURE — 3079F DIAST BP 80-89 MM HG: CPT | Performed by: NURSE PRACTITIONER

## 2024-04-02 PROCEDURE — 99396 PREV VISIT EST AGE 40-64: CPT | Performed by: NURSE PRACTITIONER

## 2024-04-02 RX ORDER — LOSARTAN POTASSIUM 25 MG/1
50 TABLET ORAL DAILY
Qty: 180 TABLET | Refills: 1 | Status: SHIPPED | OUTPATIENT
Start: 2024-04-02

## 2024-04-02 ASSESSMENT — ENCOUNTER SYMPTOMS
COUGH: 0
SHORTNESS OF BREATH: 0
DIARRHEA: 0
VOMITING: 0
WHEEZING: 0
NAUSEA: 0
ABDOMINAL PAIN: 0

## 2024-04-02 NOTE — PROGRESS NOTES
positives and negatives.     Objective:  Vitals:    04/02/24 1301 04/02/24 1340   BP: (!) 150/88 136/82   Site: Right Upper Arm    Position: Sitting    Cuff Size: Large Adult    Pulse: (!) 105    Temp: 98.6 °F (37 °C)    TempSrc: Temporal    SpO2: 99%    Weight: 88.9 kg (196 lb)    Height: 1.575 m (5' 2\")        Body mass index is 35.85 kg/m².    Physical Exam  Vitals and nursing note reviewed.   Constitutional:       General: She is not in acute distress.     Appearance: Normal appearance. She is not ill-appearing or diaphoretic.   HENT:      Head: Normocephalic and atraumatic.      Right Ear: Tympanic membrane, ear canal and external ear normal. There is no impacted cerumen.      Left Ear: Tympanic membrane, ear canal and external ear normal. There is no impacted cerumen.      Mouth/Throat:      Mouth: Mucous membranes are moist.      Pharynx: Oropharynx is clear. No oropharyngeal exudate or posterior oropharyngeal erythema.   Eyes:      General: No scleral icterus.        Right eye: No discharge.         Left eye: No discharge.      Pupils: Pupils are equal, round, and reactive to light.   Cardiovascular:      Rate and Rhythm: Normal rate and regular rhythm.      Pulses: Normal pulses.           Radial pulses are 2+ on the right side and 2+ on the left side.        Posterior tibial pulses are 2+ on the right side and 2+ on the left side.      Heart sounds: Normal heart sounds.   Pulmonary:      Effort: Pulmonary effort is normal. No respiratory distress.      Breath sounds: Normal breath sounds. No wheezing, rhonchi or rales.   Abdominal:      General: Bowel sounds are normal. There is no distension.      Palpations: Abdomen is soft.      Tenderness: There is no abdominal tenderness.   Musculoskeletal:      Cervical back: No rigidity.      Right lower leg: No edema.      Left lower leg: No edema.   Lymphadenopathy:      Head:      Right side of head: No submental, submandibular, tonsillar, preauricular, posterior

## 2024-07-22 ENCOUNTER — HOSPITAL ENCOUNTER (OUTPATIENT)
Dept: MAMMOGRAPHY | Age: 49
Discharge: HOME | End: 2024-07-25
Payer: COMMERCIAL

## 2024-07-22 VITALS — WEIGHT: 190 LBS | HEIGHT: 63 IN | BODY MASS INDEX: 33.66 KG/M2

## 2024-07-22 DIAGNOSIS — Z12.31 ENCOUNTER FOR SCREENING MAMMOGRAM FOR BREAST CANCER: ICD-10-CM

## 2024-07-22 PROCEDURE — 77063 BREAST TOMOSYNTHESIS BI: CPT

## 2024-09-17 DIAGNOSIS — I10 PRIMARY HYPERTENSION: ICD-10-CM

## 2024-09-17 RX ORDER — LOSARTAN POTASSIUM 25 MG/1
50 TABLET ORAL DAILY
Qty: 180 TABLET | Refills: 1 | Status: SHIPPED | OUTPATIENT
Start: 2024-09-17

## 2025-04-03 ENCOUNTER — OFFICE VISIT (OUTPATIENT)
Dept: INTERNAL MEDICINE CLINIC | Facility: CLINIC | Age: 50
End: 2025-04-03
Payer: COMMERCIAL

## 2025-04-03 VITALS
HEIGHT: 63 IN | BODY MASS INDEX: 33.66 KG/M2 | DIASTOLIC BLOOD PRESSURE: 90 MMHG | HEART RATE: 94 BPM | SYSTOLIC BLOOD PRESSURE: 154 MMHG | WEIGHT: 190 LBS | TEMPERATURE: 98.3 F | OXYGEN SATURATION: 98 %

## 2025-04-03 DIAGNOSIS — Z71.3 WEIGHT LOSS COUNSELING, ENCOUNTER FOR: ICD-10-CM

## 2025-04-03 DIAGNOSIS — Z00.00 ANNUAL PHYSICAL EXAM: Primary | ICD-10-CM

## 2025-04-03 DIAGNOSIS — I10 PRIMARY HYPERTENSION: ICD-10-CM

## 2025-04-03 DIAGNOSIS — Z12.31 SCREENING MAMMOGRAM FOR BREAST CANCER: ICD-10-CM

## 2025-04-03 DIAGNOSIS — E78.2 MIXED HYPERLIPIDEMIA: ICD-10-CM

## 2025-04-03 DIAGNOSIS — F41.9 ANXIETY: ICD-10-CM

## 2025-04-03 PROCEDURE — 99396 PREV VISIT EST AGE 40-64: CPT | Performed by: NURSE PRACTITIONER

## 2025-04-03 PROCEDURE — 3077F SYST BP >= 140 MM HG: CPT | Performed by: NURSE PRACTITIONER

## 2025-04-03 PROCEDURE — 3080F DIAST BP >= 90 MM HG: CPT | Performed by: NURSE PRACTITIONER

## 2025-04-03 RX ORDER — LOSARTAN POTASSIUM 25 MG/1
50 TABLET ORAL DAILY
Qty: 180 TABLET | Refills: 3 | Status: SHIPPED | OUTPATIENT
Start: 2025-04-03

## 2025-04-03 SDOH — ECONOMIC STABILITY: FOOD INSECURITY: WITHIN THE PAST 12 MONTHS, YOU WORRIED THAT YOUR FOOD WOULD RUN OUT BEFORE YOU GOT MONEY TO BUY MORE.: NEVER TRUE

## 2025-04-03 SDOH — ECONOMIC STABILITY: FOOD INSECURITY: WITHIN THE PAST 12 MONTHS, THE FOOD YOU BOUGHT JUST DIDN'T LAST AND YOU DIDN'T HAVE MONEY TO GET MORE.: NEVER TRUE

## 2025-04-03 ASSESSMENT — PATIENT HEALTH QUESTIONNAIRE - PHQ9
1. LITTLE INTEREST OR PLEASURE IN DOING THINGS: NOT AT ALL
SUM OF ALL RESPONSES TO PHQ QUESTIONS 1-9: 0
2. FEELING DOWN, DEPRESSED OR HOPELESS: NOT AT ALL

## 2025-04-03 ASSESSMENT — ENCOUNTER SYMPTOMS
COUGH: 0
VOMITING: 0
SHORTNESS OF BREATH: 0
ABDOMINAL PAIN: 0
DIARRHEA: 0
NAUSEA: 0
WHEEZING: 0

## 2025-04-03 NOTE — PROGRESS NOTES
[facility]  Office Visit Note    Subjective:  Chief Complaint   Patient presents with    Annual Exam       Patient ID: Kisha Coley is a 50 y.o. female presenting to the office for the above.    50-year-old female for annual physical.   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      CMP, CBC, lipid panel, TSH all wnl at work screening February 2025 (see scanned document).     She is interested in weight loss; discussed options and will refer to [place] weight loss program. Advised patient to contact office if they do not hear from the referral in the next 7-10 days; they express understanding.      Hypertension--  HPI March 2022:   Never diagnosed with HTN before. Has been running 150s/90s at home.  It was 160/82 at recent work screening.  Elevated in office today as well.  She checks her blood pressure at home, with readings 150s/90s.   She tries to follow a low sodium diet.  She walks daily and does workout videos for exercise.  Denies chest pain, palpitations, shortness of breath, peripheral edema, severe headaches, dizziness, vision changes.   4/5/22: She started lisinopril 10mg BID.  Home BP much better controlled.  She has been checking every day, with readings almost all 110-135/60-80. Denies chest pain, palpitations, shortness of breath, peripheral edema, severe headaches, dizziness, vision changes.   ECG in office with sinus tachycardia; she was extremely anxious during the ECG.   7/6/22: She is taking lisinopril 10mg once a day; tolerating well. Home readings 110-120s/70s.   2/23/23: Taking lisinopril 10mg daily. Home BP running 110-120s/70s.  Endorses some white coat hypertension.  Denies chest pain, palpitations, shortness of breath, peripheral edema, severe headaches, dizziness.   She has developed a nagging dry cough on the lisinopril; will switch to losartan 25mg daily; discussed risks/benefits, alternatives, potential side

## 2025-04-23 ENCOUNTER — TRANSCRIBE ORDERS (OUTPATIENT)
Dept: SCHEDULING | Age: 50
End: 2025-04-23

## 2025-04-23 DIAGNOSIS — Z12.31 OTHER SCREENING MAMMOGRAM: Primary | ICD-10-CM

## 2025-07-23 ENCOUNTER — HOSPITAL ENCOUNTER (OUTPATIENT)
Dept: MAMMOGRAPHY | Age: 50
Discharge: HOME | End: 2025-07-26
Payer: COMMERCIAL

## 2025-07-23 DIAGNOSIS — Z12.31 OTHER SCREENING MAMMOGRAM: ICD-10-CM

## 2025-07-23 PROCEDURE — 77063 BREAST TOMOSYNTHESIS BI: CPT
